# Patient Record
Sex: FEMALE | Race: WHITE | Employment: FULL TIME | URBAN - METROPOLITAN AREA
[De-identification: names, ages, dates, MRNs, and addresses within clinical notes are randomized per-mention and may not be internally consistent; named-entity substitution may affect disease eponyms.]

---

## 2018-05-15 ENCOUNTER — TELEPHONE (OUTPATIENT)
Dept: OTHER | Facility: CLINIC | Age: 38
End: 2018-05-15

## 2018-12-10 ENCOUNTER — OFFICE VISIT (OUTPATIENT)
Dept: DERMATOLOGY | Facility: CLINIC | Age: 38
End: 2018-12-10
Payer: COMMERCIAL

## 2018-12-10 DIAGNOSIS — D18.01 CHERRY ANGIOMA: ICD-10-CM

## 2018-12-10 DIAGNOSIS — L81.4 SOLAR LENTIGO: ICD-10-CM

## 2018-12-10 DIAGNOSIS — L21.9 DERMATITIS, SEBORRHEIC: ICD-10-CM

## 2018-12-10 DIAGNOSIS — L57.8 SUN-DAMAGED SKIN: Primary | ICD-10-CM

## 2018-12-10 DIAGNOSIS — D22.9 MULTIPLE BENIGN NEVI: ICD-10-CM

## 2018-12-10 DIAGNOSIS — D23.9 DERMATOFIBROMA: ICD-10-CM

## 2018-12-10 PROCEDURE — 99203 OFFICE O/P NEW LOW 30 MIN: CPT | Performed by: DERMATOLOGY

## 2018-12-10 ASSESSMENT — PAIN SCALES - GENERAL: PAINLEVEL: NO PAIN (0)

## 2018-12-10 NOTE — PROGRESS NOTES
[facility] Dermatology Note      Dermatology Problem List:  1. Clinically dysplastic nevi  -Two larger dark brown nevi with eccentric darker pigment on the left lower back and central abdomen. Symmetric across the long axis. Will monitor clinically for changes.     Encounter Date: Dec 10, 2018    CC:  Chief Complaint   Patient presents with     Skin Check     Leena is visiting for an annual skin check related to moles; no areas of concern today.         History of Present Illness:  Ms. Leena Parada is a 38 year old female who presents in self referral for a skin check. She has never seen a dermatologist before. She notes that she has had skin tags remove by her PCP and Ob/Gyn in the past. No moles removed. She notes concerns about larger and darker moles, mostly on her abdomen. She does watch her moles and has not noticed any changes for many years. No personal or family history of skin cancer. No other concerns addressed today.      Past Medical History:   Patient Active Problem List   Diagnosis     Encounter for triage in pregnant patient     Indication for care in labor or delivery     Labor and delivery indication for care or intervention     Past Medical History:   Diagnosis Date     Gestational diabetes, diet controlled 2015     No past surgical history on file.    Social History:  Patient reports that  has never smoked. she has never used smokeless tobacco. She reports that she does not drink alcohol or use drugs. 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 Patient admits to history of tanning bed usage a few times in her teens. She has a three year old child.  Patient was born [place] the [place].     Family History:  Family History   Problem Relation Age of Onset     Melanoma No family hx of      Skin Cancer No family hx of        Medications:  Current Outpatient Medications   Medication Sig Dispense Refill     Prenatal Multivit-Min-Fe-FA (PRENATAL VITAMINS PO) Take 1 tablet by mouth daily          No Known Allergies    Review of Systems:  -Constitutional: Patient is otherwise feeling well, in usual state of health.   -Skin: As above in HPI. No additional skin concerns.    Physical exam:  Vitals: There were no vitals taken for this visit.  GEN: This is a well developed, well-nourished female in no acute distress, in a pleasant mood.    SKIN: Total skin excluding the undergarment areas was performed. The exam included the head/face, neck, both arms, chest, back, abdomen, both legs, digits and/or nails and buttocks.   - Brito Type I-II  - Mild greasy scale in the scalp today.  - Few scattered brown macules on sun exposed areas.  - Multiple regular brown pigmented macules and papules are identified on the body. Greater than average number of nevi. The majority are small and medium brown. Some on the trunk are larger in size. On the left lower back and central abdomen, there are larger, dark brown nevi with eccentric darker centers. Symmetric across long axis on dermoscopy. Some checkerboard nevi on the abdomen consistent with congenital nevi. None with any concerning atypia on examination today.   -There are dome shaped bright red papules on the trunk.   -There is a firm tan/flesh colored papule that dimples with lateral pressure on the right thigh.  - No other lesions of concern on areas examined.        Impression/Plan:  1. Sun damaged skin with solar lentigines    Sun precaution was advised including the use of sun screens of SPF 30 or higher, sun protective clothing, and avoidance of tanning beds.    2. Benign findings including: cherry angiomas, dermatofibroma    No further intervention required. Patient to report changes.     Patient reassured of the benign nature of these lesions.    3. Multiple clinically benign nevi: Two larger nevi with eccentric darker components records as above. WIll monitor for changes clinically.     No further intervention required. Patient to report changes.      Patient reassured of the benign nature of these lesions.    Recommended monthly self skin checks for changing lesions.    4. Seborrheic dermatitis    Recommended OTC anti dandruff shampoo.     Follow-up 1 year for skin exam, sooner for lesions of concern.     Staff Involved:  Scribe/Staff    Scribe Disclosure  I, Leonela Thom, am serving as a scribe to document services personally performed by Dr. Carly Marino MD, based on data collection and the provider's statements to me.     Provider Disclosure:   The documentation recorded by the scribe accurately reflects the services I personally performed and the decisions made by me.    Carly Marino MD    Department of Dermatology  [facility]: Phone: [phone], Fax:[phone]  [facility]: Phone: [phone], Fax: [phone]

## 2018-12-10 NOTE — NURSING NOTE
Leena Parada's goals for this visit include:   Chief Complaint   Patient presents with     Skin Check     Leena is visiting for an annual skin check related to moles; no areas of concern today.       She requests these members of her care team be copied on today's visit information:     PCP: Grecia Alcala    Referring Provider:  No referring provider defined for this encounter.    There were no vitals taken for this visit.    Do you need any medication refills at today's visit? No  Melanie Montenegro LPN

## 2018-12-10 NOTE — LETTER
12/10/2018         RE: Leena Parada  [address] [facility] [address]        Dear Colleague,    Thank you for referring your patient, Leena Parada, to the [facility]. Please see a copy of my visit note below.    [facility] Dermatology Note      Dermatology Problem List:  1. Clinically dysplastic nevi  -Two larger dark brown nevi with eccentric darker pigment on the left lower back and central abdomen. Symmetric across the long axis. Will monitor clinically for changes.     Encounter Date: Dec 10, 2018    CC:  Chief Complaint   Patient presents with     Skin Check     Leena is visiting for an annual skin check related to moles; no areas of concern today.         History of Present Illness:  Ms. Leena Parada is a 38 year old female who presents in self referral for a skin check. She has never seen a dermatologist before. She notes that she has had skin tags remove by her PCP and Ob/Gyn in the past. No moles removed. She notes concerns about larger and darker moles, mostly on her abdomen. She does watch her moles and has not noticed any changes for many years. No personal or family history of skin cancer. No other concerns addressed today.      Past Medical History:   Patient Active Problem List   Diagnosis     Encounter for triage in pregnant patient     Indication for care in labor or delivery     Labor and delivery indication for care or intervention     Past Medical History:   Diagnosis Date     Gestational diabetes, diet controlled 2015     No past surgical history on file.    Social History:  Patient reports that  has never smoked. she has never used smokeless tobacco. She reports that she does not drink alcohol or use drugs. 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 Patient admits to history of tanning bed usage a few times in her teens. She has a three year old child.  Patient was [place] the [place].     Family History:  Family History   Problem Relation  Age of Onset     Melanoma No family hx of      Skin Cancer No family hx of        Medications:  Current Outpatient Medications   Medication Sig Dispense Refill     Prenatal Multivit-Min-Fe-FA (PRENATAL VITAMINS PO) Take 1 tablet by mouth daily         No Known Allergies    Review of Systems:  -Constitutional: Patient is otherwise feeling well, in usual state of health.   -Skin: As above in HPI. No additional skin concerns.    Physical exam:  Vitals: There were no vitals taken for this visit.  GEN: This is a well developed, well-nourished female in no acute distress, in a pleasant mood.    SKIN: Total skin excluding the undergarment areas was performed. The exam included the head/face, neck, both arms, chest, back, abdomen, both legs, digits and/or nails and buttocks.   - Brito Type I-II  - Mild greasy scale in the scalp today.  - Few scattered brown macules on sun exposed areas.  - Multiple regular brown pigmented macules and papules are identified on the body. Greater than average number of nevi. The majority are small and medium brown. Some on the trunk are larger in size. On the left lower back and central abdomen, there are larger, dark brown nevi with eccentric darker centers. Symmetric across long axis on dermoscopy. Some checkerboard nevi on the abdomen consistent with congenital nevi. None with any concerning atypia on examination today.   -There are dome shaped bright red papules on the trunk.   -There is a firm tan/flesh colored papule that dimples with lateral pressure on the right thigh.  - No other lesions of concern on areas examined.        Impression/Plan:  1. Sun damaged skin with solar lentigines    Sun precaution was advised including the use of sun screens of SPF 30 or higher, sun protective clothing, and avoidance of tanning beds.    2. Benign findings including: cherry angiomas, dermatofibroma    No further intervention required. Patient to report changes.     Patient reassured of the  benign nature of these lesions.    3. Multiple clinically benign nevi: Two larger nevi with eccentric darker components records as above. WIll monitor for changes clinically.     No further intervention required. Patient to report changes.     Patient reassured of the benign nature of these lesions.    Recommended monthly self skin checks for changing lesions.    4. Seborrheic dermatitis    Recommended OTC anti dandruff shampoo.     Follow-up 1 year for skin exam, sooner for lesions of concern.     Staff Involved:  Scribe/Staff    Scribe Disclosure  I, Leonela Zepeda, am serving as a scribe to document services personally performed by Dr. Carly Marino MD, based on data collection and the provider's statements to me.     Provider Disclosure:   The documentation recorded by the scribe accurately reflects the services I personally performed and the decisions made by me.    Carly Marino MD    Department of Dermatology  [facility]: Phone: [phone], Fax:[phone]  [facility]: Phone: [phone], Fax: [phone]              Again, thank you for allowing me to participate in the care of your patient.        Sincerely,        Carly Marino MD

## 2018-12-10 NOTE — PATIENT INSTRUCTIONS
I recommend using an over the counter anti dandruff shampoo like Head and Shoulders and DHS with zinc.

## 2021-08-04 ENCOUNTER — IMMUNIZATION (OUTPATIENT)
Dept: NURSING | Facility: CLINIC | Age: 41
End: 2021-08-04
Payer: COMMERCIAL

## 2021-08-04 DIAGNOSIS — Z23 NEED FOR VACCINATION: Primary | ICD-10-CM

## 2021-08-04 PROCEDURE — 99207 PR NO CHARGE LOS: CPT

## 2021-08-04 PROCEDURE — 0011A PR COVID VAC MODERNA 100 MCG/0.5 ML IM: CPT

## 2021-08-04 PROCEDURE — 91301 PR COVID VAC MODERNA 100 MCG/0.5 ML IM: CPT
